# Patient Record
Sex: MALE | Race: WHITE | NOT HISPANIC OR LATINO
[De-identification: names, ages, dates, MRNs, and addresses within clinical notes are randomized per-mention and may not be internally consistent; named-entity substitution may affect disease eponyms.]

---

## 2021-01-04 PROBLEM — Z00.00 ENCOUNTER FOR PREVENTIVE HEALTH EXAMINATION: Status: ACTIVE | Noted: 2021-01-04

## 2021-01-06 ENCOUNTER — APPOINTMENT (OUTPATIENT)
Dept: COLORECTAL SURGERY | Facility: CLINIC | Age: 36
End: 2021-01-06
Payer: COMMERCIAL

## 2021-01-06 VITALS
HEIGHT: 70 IN | SYSTOLIC BLOOD PRESSURE: 125 MMHG | TEMPERATURE: 98 F | BODY MASS INDEX: 29.35 KG/M2 | HEART RATE: 70 BPM | RESPIRATION RATE: 16 BRPM | DIASTOLIC BLOOD PRESSURE: 85 MMHG | WEIGHT: 205 LBS

## 2021-01-06 DIAGNOSIS — K92.1 MELENA: ICD-10-CM

## 2021-01-06 DIAGNOSIS — Z87.19 PERSONAL HISTORY OF OTHER DISEASES OF THE DIGESTIVE SYSTEM: ICD-10-CM

## 2021-01-06 DIAGNOSIS — Z87.891 PERSONAL HISTORY OF NICOTINE DEPENDENCE: ICD-10-CM

## 2021-01-06 PROCEDURE — 99072 ADDL SUPL MATRL&STAF TM PHE: CPT

## 2021-01-06 PROCEDURE — 99203 OFFICE O/P NEW LOW 30 MIN: CPT

## 2021-01-08 PROBLEM — Z87.891 FORMER SMOKER: Status: ACTIVE | Noted: 2021-01-07

## 2021-01-08 PROBLEM — Z87.19 HISTORY OF ULCERATIVE COLITIS: Status: RESOLVED | Noted: 2021-01-07 | Resolved: 2021-01-08

## 2021-01-08 PROBLEM — K92.1 BLOOD IN STOOL: Status: RESOLVED | Noted: 2021-01-07 | Resolved: 2021-01-08

## 2021-01-08 RX ORDER — MESALAMINE 375 MG/1
0.38 CAPSULE, EXTENDED RELEASE ORAL
Refills: 0 | Status: ACTIVE | COMMUNITY

## 2021-01-08 NOTE — PHYSICAL EXAM
[Normal rectal exam] : exam was normal [Fistula] : a fistula [Normal] : was normal [None] : there was no rectal mass  [Normal Breath Sounds] : Normal breath sounds [Normal Heart Sounds] : normal heart sounds [Normal Rate and Rhythm] : normal rate and rhythm [No Rash or Lesion] : No rash or lesion [Alert] : alert [Oriented to Person] : oriented to person [Oriented to Place] : oriented to place [Oriented to Time] : oriented to time [Calm] : calm [Abdomen Masses] : No abdominal masses [Abdomen Tenderness] : ~T No ~M abdominal tenderness [Tender] : nontender [Tender, Swollen] : nontender, non-swollen [JVD] : no jugular venous distention  [de-identified] : External opening left anterior [de-identified] : Looks well in no distress, of stated age. [de-identified] : pupils equal reactive to light normocephalic atraumatic. [de-identified] : moves all 4 extremities appropriately with 5 over 5 strength

## 2021-01-08 NOTE — HISTORY OF PRESENT ILLNESS
[FreeTextEntry1] : Consultation requested by Dr. Florian for chronic anal fistula. 35-year-old male developed a perirectal abscess over a year ago since then has developed a fistula with chronic drainage. Denies any fevers or chills

## 2021-01-08 NOTE — CONSULT LETTER
[Dear  ___] : Dear  [unfilled], [Consult Letter:] : I had the pleasure of evaluating your patient, [unfilled]. [Please see my note below.] : Please see my note below. [Consult Closing:] : Thank you very much for allowing me to participate in the care of this patient.  If you have any questions, please do not hesitate to contact me. [Sincerely,] : Sincerely, [FreeTextEntry3] : Rony Meza M.D. FACS, FASCRS

## 2021-01-08 NOTE — ASSESSMENT
[FreeTextEntry1] : 35-year-old male with chronic anal fistula. Recommend examination his seizure with fistulotomy possible seton placement.Risk and benefits of the surgery have been discussed which include bleeding, infection, sepsis, multiorgan failure, inadvertent injury including hollow viscus, solid organ, ureter neurovascular and neurological structures, DVT PE, heart attack, stroke, hernias, recurrence, incontinence and death.

## 2021-07-13 ENCOUNTER — APPOINTMENT (OUTPATIENT)
Dept: COLORECTAL SURGERY | Facility: CLINIC | Age: 36
End: 2021-07-13
Payer: COMMERCIAL

## 2021-07-13 PROCEDURE — 46600 DIAGNOSTIC ANOSCOPY SPX: CPT

## 2021-07-13 PROCEDURE — 99072 ADDL SUPL MATRL&STAF TM PHE: CPT

## 2021-07-13 NOTE — HISTORY OF PRESENT ILLNESS
[FreeTextEntry1] : 35-year-old male who presents for follow-up visit.  He has a known history of an anal fistula which was minimally symptomatic in the past.  He has had this present for quite a while.  Over the last couple months, he has noticed more symptoms from the area.  He is having ongoing drainage and discomfort especially with applying pressure during sitting with certain activities.  He denies any fevers or chills.  He does have a history of left-sided colitis and his most recent colonoscopy was about 6 months ago with Dr. Mercedes.  He is on Apriso for management of his colitis but has been having a flare with more frequent diarrhea in the recent weeks.

## 2021-07-13 NOTE — PROCEDURE
[FreeTextEntry1] : Anoscopy showed mild inflamed rectal mucosa consistent with his history of colitis.  No obvious opening internally through the anal fistula.

## 2021-07-13 NOTE — PHYSICAL EXAM
[Normal] : was normal [None] : there was no rectal mass  [Respiratory Effort] : normal respiratory effort [Calm] : calm [de-identified] : Left anterior perianal fistula with a palpable tract but no active infection or abscess requires drainage.  Anoscopy with no obvious internal opening of the fistula tract.  Mild colitis noted of the rectal mucosa. [de-identified] : Well-appearing, in no distress [de-identified] : Normocephalic, atraumatic [de-identified] : Moves extremities without difficulty [de-identified] : Warm and dry [de-identified] : Alert and oriented x3

## 2021-11-02 ENCOUNTER — RESULT REVIEW (OUTPATIENT)
Age: 36
End: 2021-11-02

## 2021-11-02 ENCOUNTER — OUTPATIENT (OUTPATIENT)
Dept: OUTPATIENT SERVICES | Facility: HOSPITAL | Age: 36
LOS: 1 days | End: 2021-11-02
Payer: COMMERCIAL

## 2021-11-02 VITALS
DIASTOLIC BLOOD PRESSURE: 81 MMHG | RESPIRATION RATE: 18 BRPM | OXYGEN SATURATION: 100 % | HEART RATE: 67 BPM | TEMPERATURE: 98 F | SYSTOLIC BLOOD PRESSURE: 120 MMHG | WEIGHT: 190.04 LBS | HEIGHT: 70 IN

## 2021-11-02 DIAGNOSIS — K60.3 ANAL FISTULA: ICD-10-CM

## 2021-11-02 DIAGNOSIS — Z01.818 ENCOUNTER FOR OTHER PREPROCEDURAL EXAMINATION: ICD-10-CM

## 2021-11-02 DIAGNOSIS — Z98.890 OTHER SPECIFIED POSTPROCEDURAL STATES: Chronic | ICD-10-CM

## 2021-11-02 LAB
ANION GAP SERPL CALC-SCNC: 7 MMOL/L — SIGNIFICANT CHANGE UP (ref 5–17)
APTT BLD: 36.9 SEC — HIGH (ref 27.5–35.5)
BASOPHILS # BLD AUTO: 0.04 K/UL — SIGNIFICANT CHANGE UP (ref 0–0.2)
BASOPHILS NFR BLD AUTO: 0.4 % — SIGNIFICANT CHANGE UP (ref 0–2)
BUN SERPL-MCNC: 16 MG/DL — SIGNIFICANT CHANGE UP (ref 7–23)
CALCIUM SERPL-MCNC: 9.2 MG/DL — SIGNIFICANT CHANGE UP (ref 8.5–10.1)
CHLORIDE SERPL-SCNC: 102 MMOL/L — SIGNIFICANT CHANGE UP (ref 96–108)
CO2 SERPL-SCNC: 29 MMOL/L — SIGNIFICANT CHANGE UP (ref 22–31)
CREAT SERPL-MCNC: 1.03 MG/DL — SIGNIFICANT CHANGE UP (ref 0.5–1.3)
EOSINOPHIL # BLD AUTO: 0.11 K/UL — SIGNIFICANT CHANGE UP (ref 0–0.5)
EOSINOPHIL NFR BLD AUTO: 1.1 % — SIGNIFICANT CHANGE UP (ref 0–6)
GLUCOSE SERPL-MCNC: 92 MG/DL — SIGNIFICANT CHANGE UP (ref 70–99)
HCT VFR BLD CALC: 44.9 % — SIGNIFICANT CHANGE UP (ref 39–50)
HGB BLD-MCNC: 15.6 G/DL — SIGNIFICANT CHANGE UP (ref 13–17)
IMM GRANULOCYTES NFR BLD AUTO: 0.3 % — SIGNIFICANT CHANGE UP (ref 0–1.5)
INR BLD: 1.04 RATIO — SIGNIFICANT CHANGE UP (ref 0.88–1.16)
LYMPHOCYTES # BLD AUTO: 1.71 K/UL — SIGNIFICANT CHANGE UP (ref 1–3.3)
LYMPHOCYTES # BLD AUTO: 16.7 % — SIGNIFICANT CHANGE UP (ref 13–44)
MCHC RBC-ENTMCNC: 29.2 PG — SIGNIFICANT CHANGE UP (ref 27–34)
MCHC RBC-ENTMCNC: 34.7 GM/DL — SIGNIFICANT CHANGE UP (ref 32–36)
MCV RBC AUTO: 83.9 FL — SIGNIFICANT CHANGE UP (ref 80–100)
MONOCYTES # BLD AUTO: 0.76 K/UL — SIGNIFICANT CHANGE UP (ref 0–0.9)
MONOCYTES NFR BLD AUTO: 7.4 % — SIGNIFICANT CHANGE UP (ref 2–14)
NEUTROPHILS # BLD AUTO: 7.56 K/UL — HIGH (ref 1.8–7.4)
NEUTROPHILS NFR BLD AUTO: 74.1 % — SIGNIFICANT CHANGE UP (ref 43–77)
PLATELET # BLD AUTO: 271 K/UL — SIGNIFICANT CHANGE UP (ref 150–400)
POTASSIUM SERPL-MCNC: 3.6 MMOL/L — SIGNIFICANT CHANGE UP (ref 3.5–5.3)
POTASSIUM SERPL-SCNC: 3.6 MMOL/L — SIGNIFICANT CHANGE UP (ref 3.5–5.3)
PROTHROM AB SERPL-ACNC: 12.2 SEC — SIGNIFICANT CHANGE UP (ref 10.6–13.6)
RBC # BLD: 5.35 M/UL — SIGNIFICANT CHANGE UP (ref 4.2–5.8)
RBC # FLD: 12.4 % — SIGNIFICANT CHANGE UP (ref 10.3–14.5)
SODIUM SERPL-SCNC: 138 MMOL/L — SIGNIFICANT CHANGE UP (ref 135–145)
WBC # BLD: 10.21 K/UL — SIGNIFICANT CHANGE UP (ref 3.8–10.5)
WBC # FLD AUTO: 10.21 K/UL — SIGNIFICANT CHANGE UP (ref 3.8–10.5)

## 2021-11-02 PROCEDURE — 36415 COLL VENOUS BLD VENIPUNCTURE: CPT

## 2021-11-02 PROCEDURE — 71046 X-RAY EXAM CHEST 2 VIEWS: CPT | Mod: 26

## 2021-11-02 PROCEDURE — 85610 PROTHROMBIN TIME: CPT

## 2021-11-02 PROCEDURE — 71046 X-RAY EXAM CHEST 2 VIEWS: CPT

## 2021-11-02 PROCEDURE — 93005 ELECTROCARDIOGRAM TRACING: CPT

## 2021-11-02 PROCEDURE — 85730 THROMBOPLASTIN TIME PARTIAL: CPT

## 2021-11-02 PROCEDURE — 93010 ELECTROCARDIOGRAM REPORT: CPT

## 2021-11-02 PROCEDURE — 85025 COMPLETE CBC W/AUTO DIFF WBC: CPT

## 2021-11-02 PROCEDURE — 80048 BASIC METABOLIC PNL TOTAL CA: CPT

## 2021-11-02 PROCEDURE — G0463: CPT | Mod: 25

## 2021-11-02 NOTE — H&P PST ADULT - NSICDXPASTMEDICALHX_GEN_ALL_CORE_FT
PAST MEDICAL HISTORY:  COVID-19 vaccine series completed [place] 6/1/21    GERD (gastroesophageal reflux disease)     Ulcerative colitis      PAST MEDICAL HISTORY:  Anal abscess     Anal fistula     COVID-19 vaccine series completed [place] 6/1/21    GERD (gastroesophageal reflux disease)     Ulcerative colitis      PAST MEDICAL HISTORY:  Anal abscess     Anal fistula     COVID-19 vaccine series completed [place] 6/1/21    COVID-19 virus infection     GERD (gastroesophageal reflux disease)     Ulcerative colitis

## 2021-11-02 NOTE — H&P PST ADULT - NSICDXFAMILYHX_GEN_ALL_CORE_FT
FAMILY HISTORY:  Father  Still living? No  FH: myocardial infarction, Age at diagnosis: Age Unknown    Mother  Still living? Unknown  FH: asthma, Age at diagnosis: Age Unknown

## 2021-11-02 NOTE — H&P PST ADULT - ASSESSMENT
35 y/o male with ulcerative colitis, PMH GERD. Pt experiencing bloody stool intermittently x10 years. Denies pain. Pt in PST for scheduled fistulotomy and possible stent placement.    Plan  1. Stop all NSAIDS, herbal supplements and vitamins for 7 days.  2. NPO  as per ASU instructions.  3. COVID test on 11/07/2021, pt instructed.  4. Labs, EKG, CXR as per surgeon.           37 y/o male with ulcerative colitis, PMH GERD. Pt experiencing bloody stool intermittently x10 years. Denies pain. He is scheduled for fistulotomy and possible seton placement.  Plan  1. Stop all NSAIDS, herbal supplements and vitamins for 7 days.  2. NPO  as per ASU instructions.  3. COVID test on 11/07/2021, pt instructed.  4. Labs, EKG, CXR as per surgeon.  5. Abnormal EKG in PST, Faxed to surgeon, spoke with Nelda.

## 2021-11-02 NOTE — H&P PST ADULT - HISTORY OF PRESENT ILLNESS
35 y/o male with ulcerative colitis, PMH GERD. Pt experiencing bloody stool intermittently x10 years. Denies pain. Pt in PST for scheduled fistulotomy and possible stent placement. 35 y/o male with ulcerative colitis, PMH GERD. Pt experiencing bloody stool intermittently x10 years. Denies pain. Pt in PST for scheduled fistulotomy and possible seton placement.

## 2021-11-02 NOTE — H&P PST ADULT - NSWEIGHTCALCTOOLDRUG_GEN_A_CORE
----- Message from Danyelle Nunez sent at 7/24/2017  2:08 PM EDT -----  [facility]  Lisinopril 5 mg QD.   Zantac 150 mg BID.       I sent in his Lisinopril is it okay to send in his Zantac?    used

## 2021-11-03 DIAGNOSIS — Z01.818 ENCOUNTER FOR OTHER PREPROCEDURAL EXAMINATION: ICD-10-CM

## 2021-11-03 DIAGNOSIS — K60.3 ANAL FISTULA: ICD-10-CM

## 2021-11-06 DIAGNOSIS — Z01.818 ENCOUNTER FOR OTHER PREPROCEDURAL EXAMINATION: ICD-10-CM

## 2021-11-07 ENCOUNTER — APPOINTMENT (OUTPATIENT)
Dept: DISASTER EMERGENCY | Facility: CLINIC | Age: 36
End: 2021-11-07

## 2021-11-08 LAB — SARS-COV-2 N GENE NPH QL NAA+PROBE: NOT DETECTED

## 2021-11-09 RX ORDER — FENTANYL CITRATE 50 UG/ML
50 INJECTION INTRAVENOUS
Refills: 0 | Status: DISCONTINUED | OUTPATIENT
Start: 2021-11-10 | End: 2021-11-10

## 2021-11-09 RX ORDER — ONDANSETRON 8 MG/1
4 TABLET, FILM COATED ORAL ONCE
Refills: 0 | Status: DISCONTINUED | OUTPATIENT
Start: 2021-11-10 | End: 2021-11-10

## 2021-11-09 RX ORDER — SODIUM CHLORIDE 9 MG/ML
1000 INJECTION, SOLUTION INTRAVENOUS
Refills: 0 | Status: DISCONTINUED | OUTPATIENT
Start: 2021-11-10 | End: 2021-11-10

## 2021-11-09 RX ORDER — OXYCODONE HYDROCHLORIDE 5 MG/1
10 TABLET ORAL ONCE
Refills: 0 | Status: DISCONTINUED | OUTPATIENT
Start: 2021-11-10 | End: 2021-11-10

## 2021-11-10 ENCOUNTER — APPOINTMENT (OUTPATIENT)
Dept: COLORECTAL SURGERY | Facility: HOSPITAL | Age: 36
End: 2021-11-10
Payer: COMMERCIAL

## 2021-11-10 ENCOUNTER — OUTPATIENT (OUTPATIENT)
Dept: INPATIENT UNIT | Facility: HOSPITAL | Age: 36
LOS: 1 days | Discharge: HOME | End: 2021-11-10
Payer: COMMERCIAL

## 2021-11-10 VITALS
SYSTOLIC BLOOD PRESSURE: 116 MMHG | DIASTOLIC BLOOD PRESSURE: 75 MMHG | OXYGEN SATURATION: 100 % | RESPIRATION RATE: 14 BRPM | HEIGHT: 70 IN | WEIGHT: 190.04 LBS | TEMPERATURE: 98 F | HEART RATE: 76 BPM

## 2021-11-10 VITALS
TEMPERATURE: 98 F | RESPIRATION RATE: 13 BRPM | HEART RATE: 67 BPM | DIASTOLIC BLOOD PRESSURE: 70 MMHG | SYSTOLIC BLOOD PRESSURE: 110 MMHG | OXYGEN SATURATION: 100 %

## 2021-11-10 DIAGNOSIS — Z98.890 OTHER SPECIFIED POSTPROCEDURAL STATES: Chronic | ICD-10-CM

## 2021-11-10 DIAGNOSIS — K60.3 ANAL FISTULA: ICD-10-CM

## 2021-11-10 DIAGNOSIS — K61.0 ANAL ABSCESS: ICD-10-CM

## 2021-11-10 PROCEDURE — 87102 FUNGUS ISOLATION CULTURE: CPT

## 2021-11-10 PROCEDURE — 46060 I&D ISCHIORECTAL/NTRMRL ABSC: CPT

## 2021-11-10 PROCEDURE — 87077 CULTURE AEROBIC IDENTIFY: CPT

## 2021-11-10 PROCEDURE — 87075 CULTR BACTERIA EXCEPT BLOOD: CPT

## 2021-11-10 PROCEDURE — 87116 MYCOBACTERIA CULTURE: CPT

## 2021-11-10 PROCEDURE — 87070 CULTURE OTHR SPECIMN AEROBIC: CPT

## 2021-11-10 PROCEDURE — 87186 SC STD MICRODIL/AGAR DIL: CPT

## 2021-11-10 RX ORDER — ONDANSETRON 8 MG/1
4 TABLET, FILM COATED ORAL EVERY 6 HOURS
Refills: 0 | Status: DISCONTINUED | OUTPATIENT
Start: 2021-11-10 | End: 2021-11-10

## 2021-11-10 RX ORDER — OXYCODONE AND ACETAMINOPHEN 5; 325 MG/1; MG/1
1 TABLET ORAL EVERY 4 HOURS
Refills: 0 | Status: DISCONTINUED | OUTPATIENT
Start: 2021-11-10 | End: 2021-11-10

## 2021-11-10 RX ORDER — MESALAMINE 400 MG
4 TABLET, DELAYED RELEASE (ENTERIC COATED) ORAL
Qty: 0 | Refills: 0 | DISCHARGE

## 2021-11-10 NOTE — ASU DISCHARGE PLAN (ADULT/PEDIATRIC) - CALL YOUR DOCTOR IF YOU HAVE ANY OF THE FOLLOWING:
Bleeding that does not stop/Pain not relieved by Medications/Wound/Surgical Site with redness, or foul smelling discharge or pus/Unable to urinate/Inability to tolerate liquids or foods

## 2021-11-10 NOTE — BRIEF OPERATIVE NOTE - NSICDXBRIEFPROCEDURE_GEN_ALL_CORE_FT
PROCEDURES:  Drainage of perirectal abscess 10-Nov-2021 08:17:33  Joseph Almonte  Perianal fistulotomy 10-Nov-2021 08:18:07  Joseph Almonte

## 2021-11-10 NOTE — BRIEF OPERATIVE NOTE - NSICDXBRIEFPOSTOP_GEN_ALL_CORE_FT
POST-OP DIAGNOSIS:  Anal fistula 10-Nov-2021 08:18:23  Joseph Almonte  Perirectal abscess 10-Nov-2021 08:18:35  Joseph Almonte

## 2021-11-10 NOTE — ASU DISCHARGE PLAN (ADULT/PEDIATRIC) - CARE PROVIDER_API CALL
Mother/Father
Rony Meza)  ColonRectal Surgery; Surgery  [address] [address]  Phone: [phone]  Fax: [phone]  Follow Up Time: 1 month

## 2021-11-10 NOTE — ASU PATIENT PROFILE, ADULT - NSICDXPASTMEDICALHX_GEN_ALL_CORE_FT
PAST MEDICAL HISTORY:  Anal abscess     Anal fistula     COVID-19 vaccine series completed [place] 6/1/21    COVID-19 virus infection     GERD (gastroesophageal reflux disease)     Ulcerative colitis

## 2021-11-16 DIAGNOSIS — K61.39 OTHER ISCHIORECTAL ABSCESS: ICD-10-CM

## 2021-11-16 DIAGNOSIS — Z87.891 PERSONAL HISTORY OF NICOTINE DEPENDENCE: ICD-10-CM

## 2021-11-19 PROBLEM — K61.0 ANAL ABSCESS: Chronic | Status: ACTIVE | Noted: 2021-11-02

## 2021-11-19 PROBLEM — Z92.29 PERSONAL HISTORY OF OTHER DRUG THERAPY: Chronic | Status: ACTIVE | Noted: 2021-11-02

## 2021-11-19 PROBLEM — U07.1 COVID-19: Chronic | Status: ACTIVE | Noted: 2021-11-02

## 2021-11-19 PROBLEM — K21.9 GASTRO-ESOPHAGEAL REFLUX DISEASE WITHOUT ESOPHAGITIS: Chronic | Status: ACTIVE | Noted: 2021-11-02

## 2021-11-19 PROBLEM — K51.90 ULCERATIVE COLITIS, UNSPECIFIED, WITHOUT COMPLICATIONS: Chronic | Status: ACTIVE | Noted: 2021-11-02

## 2021-11-19 PROBLEM — K60.3 ANAL FISTULA: Chronic | Status: ACTIVE | Noted: 2021-11-02

## 2021-11-23 ENCOUNTER — APPOINTMENT (OUTPATIENT)
Dept: DERMATOLOGY | Facility: CLINIC | Age: 36
End: 2021-11-23
Payer: COMMERCIAL

## 2021-11-23 DIAGNOSIS — L72.11 PILAR CYST: ICD-10-CM

## 2021-11-23 PROCEDURE — 99202 OFFICE O/P NEW SF 15 MIN: CPT

## 2021-11-23 RX ORDER — MESALAMINE 1.2 G/1
TABLET, DELAYED RELEASE ORAL
Refills: 0 | Status: ACTIVE | COMMUNITY

## 2021-11-23 NOTE — HISTORY OF PRESENT ILLNESS
[FreeTextEntry1] : Bumps on penis [de-identified] : First visit for 36-year-old white male presenting with "bumps on the penis" present since childhood.  Becoming more pronounced. No previous treatment.\par Also concerned about a lump on the scalp.\par \par Note: Patient is status post repair of an anal fissure. Still has a residual ulcer from the procedure

## 2021-11-23 NOTE — ASSESSMENT
[FreeTextEntry1] : Normal variant on the penile shaft\par Pilar cyst on scalp\par Persistent ulcer from prior anal fissure repair

## 2021-11-23 NOTE — PHYSICAL EXAM
[Alert] : alert [Oriented x 3] : ~L oriented x 3 [Well Nourished] : well nourished [FreeTextEntry3] : Type II skin\par \par Patient wearing a facemask\par \par Penile shaft:  multiple 1-2 mm skin-colored papules, many with extruding hairs\par Left parietal scalp: 1 cm freely movable nodule\par Left lower intergluteal cleft area: Large ulcer present without evidence of infection\par \par No suspicious lesions seen

## 2021-12-28 ENCOUNTER — APPOINTMENT (OUTPATIENT)
Dept: COLORECTAL SURGERY | Facility: CLINIC | Age: 36
End: 2021-12-28
Payer: COMMERCIAL

## 2021-12-28 VITALS — SYSTOLIC BLOOD PRESSURE: 108 MMHG | TEMPERATURE: 98.2 F | DIASTOLIC BLOOD PRESSURE: 67 MMHG | HEART RATE: 83 BPM

## 2021-12-28 DIAGNOSIS — Z09 ENCOUNTER FOR FOLLOW-UP EXAMINATION AFTER COMPLETED TREATMENT FOR CONDITIONS OTHER THAN MALIGNANT NEOPLASM: ICD-10-CM

## 2021-12-28 PROCEDURE — 99024 POSTOP FOLLOW-UP VISIT: CPT

## 2021-12-28 NOTE — PHYSICAL EXAM
[Normal] : was normal [None] : there was no rectal mass  [Respiratory Effort] : normal respiratory effort [Calm] : calm [de-identified] : Left anterior perianal scar, completely healed [de-identified] : Well-appearing, in no distress [de-identified] : Normocephalic, atraumatic [de-identified] : Moves extremities without difficulty [de-identified] : Warm and dry [de-identified] : Alert and oriented x3

## 2021-12-28 NOTE — HISTORY OF PRESENT ILLNESS
[FreeTextEntry1] : 35-year-old male who presents for follow-up visit.  He has a known history of an anal fistula which was minimally symptomatic in the past.  He has had this present for quite a while.  Over the last couple months, he has noticed more symptoms from the area.  He is having ongoing drainage and discomfort especially with applying pressure during sitting with certain activities.  He denies any fevers or chills.  He does have a history of left-sided colitis and his most recent colonoscopy was about 6 months ago with Dr. Mercedes.  He is on Apriso for management of his colitis but has been having a flare with more frequent diarrhea in the recent weeks.\par \par 12/28/21 Luis returns to the office for a postoperative visit approximately 6 weeks after undergoing left anterior fistulotomy on 11/10/2021 with Patience. He denies any anorectal pain or discharge or bleeding. Bowel movements are at baseline considering he has UC.

## 2021-12-28 NOTE — ASSESSMENT
[FreeTextEntry1] : 12/28/21 Luis presents to the office for a postoperative visit after undergoing a left anterior subcutaneous fistulotomy on 11/10/2021 with Patience. Postoperatively, doing well with no issues of pain, discharge, bleeding or passing stools. Physical exam revealed completely epithelialized fistulotomy site and mild induration at the anal canal. As he has no active surgical issues, he can be discharged from postoperative checks and return to his GI doctor for ongoing management of his UC.

## 2022-02-09 ENCOUNTER — TRANSCRIPTION ENCOUNTER (OUTPATIENT)
Age: 37
End: 2022-02-09

## 2022-02-09 ENCOUNTER — APPOINTMENT (OUTPATIENT)
Dept: COLORECTAL SURGERY | Facility: CLINIC | Age: 37
End: 2022-02-09
Payer: COMMERCIAL

## 2022-02-09 VITALS
BODY MASS INDEX: 26.63 KG/M2 | WEIGHT: 186 LBS | HEIGHT: 70 IN | HEART RATE: 69 BPM | TEMPERATURE: 97.6 F | SYSTOLIC BLOOD PRESSURE: 138 MMHG | DIASTOLIC BLOOD PRESSURE: 83 MMHG

## 2022-02-09 DIAGNOSIS — K60.3 ANAL FISTULA: ICD-10-CM

## 2022-02-09 PROCEDURE — 99213 OFFICE O/P EST LOW 20 MIN: CPT

## 2022-02-09 RX ORDER — CIPROFLOXACIN HYDROCHLORIDE 500 MG/1
500 TABLET, FILM COATED ORAL TWICE DAILY
Qty: 20 | Refills: 0 | Status: DISCONTINUED | COMMUNITY
Start: 2021-07-12 | End: 2022-02-09

## 2022-02-09 NOTE — HISTORY OF PRESENT ILLNESS
[FreeTextEntry1] : 36-year-old male with a history of colitis who presents for follow-up visit.  He underwent a perianal fistulotomy in November 2021 and healed from his fistula.  Over the last few weeks, he has had a flareup of his colitis was having upwards of 10 bowel movements a day.  He has been seen by his gastroenterologist who ordered blood work, stool studies and added mesalamine enema in addition to his Apriso.  The plan is also to add oral steroids and consider biologic if he does not improve.  A few days ago, he noticed a fullness and eventual drainage of clear fluid from the perianal region and is concerned about an abscess or recurrent fistula.  He has no pain from the site.  No fevers or chills.  No ongoing drainage since the spontaneous rupture.  He has also been quite active at the gym and actually noticed improvement in his GI symptoms when he decreased activity level. \par

## 2022-02-09 NOTE — PHYSICAL EXAM
[Respiratory Effort] : normal respiratory effort [Calm] : calm [de-identified] : Left anterior perianal scar with 1 cm opening and healthy granulation tissue at the base.  No surrounding erythema or fluctuance.  No active drainage of any pus.  Does not seem to be a clear fistula.  Anoscopy deferred.  [de-identified] : Well-appearing, in no distress [de-identified] : Normocephalic, atraumatic [de-identified] : Moves extremities without difficulty [de-identified] : Warm and dry [de-identified] : Alert and oriented x3

## 2023-09-14 NOTE — ASU DISCHARGE PLAN (ADULT/PEDIATRIC) - DISCHARGE PLAN IS COMPLETE AND GIVEN TO PATIENT
How Severe Are Your Spot(S)?: mild What Is The Reason For Today's Visit?: Skin Lesion What Is The Reason For Today's Visit? (Being Monitored For X): concerning skin lesions on a periodic basis : Yes

## 2024-11-13 ENCOUNTER — APPOINTMENT (OUTPATIENT)
Dept: ORTHOPEDIC SURGERY | Facility: CLINIC | Age: 39
End: 2024-11-13
Payer: COMMERCIAL

## 2024-11-13 DIAGNOSIS — M75.52 BURSITIS OF LEFT SHOULDER: ICD-10-CM

## 2024-11-13 DIAGNOSIS — M25.512 PAIN IN LEFT SHOULDER: ICD-10-CM

## 2024-11-13 DIAGNOSIS — M75.42 IMPINGEMENT SYNDROME OF LEFT SHOULDER: ICD-10-CM

## 2024-11-13 DIAGNOSIS — M75.32 CALCIFIC TENDINITIS OF LEFT SHOULDER: ICD-10-CM

## 2024-11-13 PROCEDURE — 73030 X-RAY EXAM OF SHOULDER: CPT | Mod: LT

## 2024-11-13 PROCEDURE — 99204 OFFICE O/P NEW MOD 45 MIN: CPT

## 2024-12-03 ENCOUNTER — RESULT REVIEW (OUTPATIENT)
Age: 39
End: 2024-12-03

## 2025-02-26 ENCOUNTER — APPOINTMENT (OUTPATIENT)
Dept: ORTHOPEDIC SURGERY | Facility: CLINIC | Age: 40
End: 2025-02-26
Payer: COMMERCIAL

## 2025-02-26 DIAGNOSIS — M75.42 IMPINGEMENT SYNDROME OF LEFT SHOULDER: ICD-10-CM

## 2025-02-26 DIAGNOSIS — M75.51 BURSITIS OF RIGHT SHOULDER: ICD-10-CM

## 2025-02-26 DIAGNOSIS — M75.32 CALCIFIC TENDINITIS OF LEFT SHOULDER: ICD-10-CM

## 2025-02-26 DIAGNOSIS — M75.31 CALCIFIC TENDINITIS OF RIGHT SHOULDER: ICD-10-CM

## 2025-02-26 DIAGNOSIS — M25.512 PAIN IN LEFT SHOULDER: ICD-10-CM

## 2025-02-26 PROCEDURE — 73030 X-RAY EXAM OF SHOULDER: CPT | Mod: RT

## 2025-02-26 PROCEDURE — 99214 OFFICE O/P EST MOD 30 MIN: CPT

## 2025-02-26 PROCEDURE — 73010 X-RAY EXAM OF SHOULDER BLADE: CPT | Mod: RT

## 2025-02-26 RX ORDER — CELECOXIB 200 MG/1
200 CAPSULE ORAL
Qty: 30 | Refills: 1 | Status: ACTIVE | COMMUNITY
Start: 2025-02-26 | End: 1900-01-01

## 2025-02-26 RX ORDER — METHYLPREDNISOLONE 4 MG/1
4 TABLET ORAL
Qty: 1 | Refills: 0 | Status: ACTIVE | COMMUNITY
Start: 2025-02-26 | End: 1900-01-01

## 2025-03-21 ENCOUNTER — APPOINTMENT (OUTPATIENT)
Dept: INTERNAL MEDICINE | Facility: CLINIC | Age: 40
End: 2025-03-21

## 2025-04-24 ENCOUNTER — APPOINTMENT (OUTPATIENT)
Dept: CARDIOLOGY | Facility: CLINIC | Age: 40
End: 2025-04-24